# Patient Record
Sex: FEMALE | Race: WHITE | NOT HISPANIC OR LATINO | Employment: UNEMPLOYED | URBAN - METROPOLITAN AREA
[De-identification: names, ages, dates, MRNs, and addresses within clinical notes are randomized per-mention and may not be internally consistent; named-entity substitution may affect disease eponyms.]

---
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
## 2020-09-08 ENCOUNTER — OFFICE VISIT - HEALTHEAST (OUTPATIENT)
Dept: FAMILY MEDICINE | Facility: CLINIC | Age: 20
End: 2020-09-08

## 2020-09-08 DIAGNOSIS — R42 EPISODIC LIGHTHEADEDNESS: ICD-10-CM

## 2020-09-08 LAB
ANION GAP SERPL CALCULATED.3IONS-SCNC: 10 MMOL/L (ref 5–18)
BASOPHILS # BLD AUTO: 0.1 THOU/UL (ref 0–0.2)
BASOPHILS NFR BLD AUTO: 1 % (ref 0–2)
BUN SERPL-MCNC: 10 MG/DL (ref 8–22)
CALCIUM SERPL-MCNC: 9.6 MG/DL (ref 8.5–10.5)
CHLORIDE BLD-SCNC: 109 MMOL/L (ref 98–107)
CO2 SERPL-SCNC: 23 MMOL/L (ref 22–31)
CREAT SERPL-MCNC: 0.78 MG/DL (ref 0.6–1.1)
EOSINOPHIL # BLD AUTO: 0.1 THOU/UL (ref 0–0.4)
EOSINOPHIL NFR BLD AUTO: 2 % (ref 0–6)
ERYTHROCYTE [DISTWIDTH] IN BLOOD BY AUTOMATED COUNT: 10.3 % (ref 11–14.5)
GFR SERPL CREATININE-BSD FRML MDRD: >60 ML/MIN/1.73M2
GLUCOSE BLD-MCNC: 78 MG/DL (ref 70–125)
HCT VFR BLD AUTO: 42.6 % (ref 35–47)
HGB BLD-MCNC: 14.3 G/DL (ref 12–16)
LYMPHOCYTES # BLD AUTO: 3.1 THOU/UL (ref 0.8–4.4)
LYMPHOCYTES NFR BLD AUTO: 41 % (ref 20–40)
MCH RBC QN AUTO: 31.6 PG (ref 27–34)
MCHC RBC AUTO-ENTMCNC: 33.5 G/DL (ref 32–36)
MCV RBC AUTO: 94 FL (ref 80–100)
MONOCYTES # BLD AUTO: 0.4 THOU/UL (ref 0–0.9)
MONOCYTES NFR BLD AUTO: 6 % (ref 2–10)
NEUTROPHILS # BLD AUTO: 3.8 THOU/UL (ref 2–7.7)
NEUTROPHILS NFR BLD AUTO: 51 % (ref 50–70)
PLATELET # BLD AUTO: 334 THOU/UL (ref 140–440)
PMV BLD AUTO: 7.2 FL (ref 7–10)
POTASSIUM BLD-SCNC: 4.6 MMOL/L (ref 3.5–5)
RBC # BLD AUTO: 4.52 MILL/UL (ref 3.8–5.4)
SODIUM SERPL-SCNC: 142 MMOL/L (ref 136–145)
WBC: 7.5 THOU/UL (ref 4–11)

## 2020-09-08 ASSESSMENT — MIFFLIN-ST. JEOR
SCORE: 1639.5
SCORE: 1639.5

## 2020-09-09 ENCOUNTER — COMMUNICATION - HEALTHEAST (OUTPATIENT)
Dept: FAMILY MEDICINE | Facility: CLINIC | Age: 20
End: 2020-09-09

## 2020-11-12 ENCOUNTER — OFFICE VISIT - HEALTHEAST (OUTPATIENT)
Dept: FAMILY MEDICINE | Facility: CLINIC | Age: 20
End: 2020-11-12

## 2020-11-12 DIAGNOSIS — G43.809 OTHER MIGRAINE WITHOUT STATUS MIGRAINOSUS, NOT INTRACTABLE: ICD-10-CM

## 2020-11-12 DIAGNOSIS — M54.2 CERVICALGIA: ICD-10-CM

## 2020-11-12 ASSESSMENT — MIFFLIN-ST. JEOR: SCORE: 1641.31

## 2021-06-04 VITALS
BODY MASS INDEX: 25.38 KG/M2 | HEART RATE: 64 BPM | RESPIRATION RATE: 16 BRPM | WEIGHT: 177.25 LBS | DIASTOLIC BLOOD PRESSURE: 80 MMHG | OXYGEN SATURATION: 100 % | SYSTOLIC BLOOD PRESSURE: 122 MMHG | HEIGHT: 70 IN

## 2021-06-04 VITALS — BODY MASS INDEX: 25.07 KG/M2 | HEIGHT: 70 IN | WEIGHT: 175.1 LBS | TEMPERATURE: 98.1 F

## 2021-06-11 NOTE — PROGRESS NOTES
ASSESSMENT:  1. Episodic lightheadedness  - HM1(CBC and Differential)  - Basic Metabolic Panel  - HM1 (CBC with Diff)  Based on her history some examination it does appear that she has orthostatic hypotension, but I want to have her checked out to make sure there is no specific abnormality that may need to be managed.  As such we will going to get labs noted.  I also did discuss with her the management at this time including increasing the amount of fluid that she has.  And that may be a possibility of connection between having multiple episodes of running stool as well as increased menstrual bleed all adding together to the symptoms.  Will have her get the labs and will discuss with her.    PLAN:  There are no Patient Instructions on file for this visit.    Orders Placed This Encounter   Procedures     Basic Metabolic Panel     HM1 (CBC with Diff)     There are no discontinued medications.    No follow-ups on file.      CHIEF COMPLAINT:  Chief Complaint   Patient presents with     Loss of Consciousness     x 2-3 weeks       HISTORY OF PRESENT ILLNESS:  Shell is a 19 y.o. female presenting to the clinic today.  Patient complains of near syncope.  She has noted episodes whereby she will be sitting down and on attempting to get up will feel that she is going to fall.  Onset was 3 weeks ago, with intermittent course since that time. Patient describes the episode as never actually lost consciousness, had precursor symptoms only, including diaphoresis, greyed out vision, sweatiness, shakiness with slight increase in her heartbeat and tinnitus. Patient also has associated symptoms of  diarrhea, heavy menstrual bleeding and tachycardia/palpitations. The patient denies abdominal pain, excessive thirst and visual aura. Taking culprit meds?: none.  She noted that most of the problems happened when she had actually been slightly physical.  She is noted to sweat a lot.    REVIEW OF SYSTEMS:   Full review of system was done and  is as in the history.  She does have some anxiety that is being managed.  Intermittently smokes.  She is also physically active.  All other systems are negative.    PFSH:  Reviewed, as below.    Social History     Tobacco Use   Smoking Status Current Some Day Smoker   Smokeless Tobacco Current User       No family history on file.    Social History     Socioeconomic History     Marital status: Single     Spouse name: Not on file     Number of children: Not on file     Years of education: Not on file     Highest education level: Not on file   Occupational History     Not on file   Social Needs     Financial resource strain: Not on file     Food insecurity     Worry: Not on file     Inability: Not on file     Transportation needs     Medical: Not on file     Non-medical: Not on file   Tobacco Use     Smoking status: Current Some Day Smoker     Smokeless tobacco: Current User   Substance and Sexual Activity     Alcohol use: Not on file     Drug use: Not on file     Sexual activity: Not on file   Lifestyle     Physical activity     Days per week: Not on file     Minutes per session: Not on file     Stress: Not on file   Relationships     Social connections     Talks on phone: Not on file     Gets together: Not on file     Attends Yazidism service: Not on file     Active member of club or organization: Not on file     Attends meetings of clubs or organizations: Not on file     Relationship status: Not on file     Intimate partner violence     Fear of current or ex partner: Not on file     Emotionally abused: Not on file     Physically abused: Not on file     Forced sexual activity: Not on file   Other Topics Concern     Not on file   Social History Narrative     Not on file       Past Surgical History:   Procedure Laterality Date     ID REMOVE TONSILS/ADENOIDS,<12 Y/O      Description: Tonsillectomy With Adenoidectomy;  Recorded: 01/18/2012;       No Known Allergies    Active Ambulatory Problems     Diagnosis Date Noted  "    Finger Injury      Nasal Passage Blockage (Stuffiness)      Intermittent asthma 02/03/2012     Ulcerative colitis (H) 08/30/2013     Migraine 01/06/2015     Anxiety 01/06/2015     Resolved Ambulatory Problems     Diagnosis Date Noted     No Resolved Ambulatory Problems     No Additional Past Medical History       Current Outpatient Medications   Medication Sig Dispense Refill     albuterol (PROAIR HFA;PROVENTIL HFA;VENTOLIN HFA) 90 mcg/actuation inhaler Inhale 2-4 puffs.       etonogestreL-ethinyl estradioL (NUVARING) 0.12-0.015 mg/24 hr vaginal ring Insert 1 each into the vagina every 28 days. Insert vaginally and leave in place for 3 consecutive weeks, then remove for 1 week.       No current facility-administered medications for this visit.        VITALS:  Vitals:    09/08/20 1434 09/08/20 1435   Temp: 98.1  F (36.7  C)    TempSrc: Oral    Weight: 175 lb 1.6 oz (79.4 kg) 175 lb 1.6 oz (79.4 kg)   Height: 5' 10\" (1.778 m) 5' 10\" (1.778 m)     Wt Readings from Last 3 Encounters:   09/08/20 175 lb 1.6 oz (79.4 kg)     Body mass index is 25.12 kg/m .    PHYSICAL EXAM:  General Appearance: Alert, cooperative, no distress, appears stated age  HEENT: Pupils are equal and reactive, extraocular motions is normal.  Oropharynx is moist.  Neck is supple no notable thyromegaly.  External ears are normal.  Lungs: Clear to auscultation bilaterally, respirations unlabored  Heart: Regular rhythm and normal rate,S1 and S2 normal, no murmur, rub, or gallop she does have orthostatic blood pressures checked which appeared to be positive between laying down and standing up.  Abdomen: Soft  Musculoskeletal: Normal range of motion. No joint swelling or deformity.   Neurologic:  Alert and oriented times 3. Cranial nerves II-XII intact.   Psychiatric: Normal mood and affect.  Please inform the patient that the labs that she had  MEDICATIONS:  Current Outpatient Medications   Medication Sig Dispense Refill     albuterol (PROAIR " HFA;PROVENTIL HFA;VENTOLIN HFA) 90 mcg/actuation inhaler Inhale 2-4 puffs.       etonogestreL-ethinyl estradioL (NUVARING) 0.12-0.015 mg/24 hr vaginal ring Insert 1 each into the vagina every 28 days. Insert vaginally and leave in place for 3 consecutive weeks, then remove for 1 week.       No current facility-administered medications for this visit.

## 2021-06-13 NOTE — PROGRESS NOTES
"  Assessment:     1. Other migraine without status migrainosus, not intractable    2. Cervicalgia            Plan:           We reviewed the likely/potential etiology(ies) for her HA symptoms and we will begin a trial of imitrex as directed. We reviewed potential side effects at length, including fatigue and head pressure, and she will call or return to clinic with any significant difficulties. I will also send a new Rx for flexeril to try at bedtime to see if that helps break the cycle. We reviewed use of OTC analgesics as well as increased fluids and rest, and she will call or return to clinic with any ongoing or worsening symptoms.       Subjective:         Headaches   Shell Coronel is a 20 y.o. female who presents for follow-up of migraine headaches. Headaches are occurring several times per month. Generally, the headaches last a few hours, although the last one she had has been going on for 7 days now. Home treatment has included OTC tylenol, massage and chiropractic with little improvement. She usually gets good relief with chiropractic. She can't take NSAIDs due to her UC. She has some nausea, vomiting, light-headedness and photophobia with headaches and they are worse with head movements. The patient denies muscle weakness, numbness of extremities, speech difficulties and vision problems. She has had some neck pain at times, and has a history of clavicle fracture.     The following portions of the patient's history were reviewed and updated as appropriate: allergies, current medications, past family history, past medical history, past social history, past surgical history and problem list.    Review of Systems  A 12 point comprehensive review of systems was negative except as noted.      Objective:        /80   Pulse 64   Resp 16   Ht 5' 9.5\" (1.765 m)   Wt 177 lb 4 oz (80.4 kg)   SpO2 100%   Breastfeeding No   BMI 25.80 kg/m    Physical Exam:  GEN: Alert and oriented, NAD,  well " nourished  SKIN:  Normal skin turgor, no lesions/rashes   HEENT: moist mucous membranes, no rhinorrhea.    NECK: Normal.  No adenopathy or thyromegaly.  CV: Regular rate and rhythm, no murmurs.   LUNGS: Clear to auscultation bilaterally.    ABDOMEN: Soft, non-tender, non-distended, no masses   EXTREMITY: No edema, cyanosis  NEURO: Grossly normal.

## 2021-06-20 NOTE — LETTER
Letter by Joann Coburn, Nursing Student at      Author: Joann Coburn, Nursing Student Service: -- Author Type: --    Filed:  Encounter Date: 9/9/2020 Status: (Other)        Shell Coronel  [address]  [place] [address]             September 9, 2020         Dear Shell Coronel,    Below are the results from your recent visit:    Resulted Orders   Basic Metabolic Panel   Result Value Ref Range    Sodium 142 136 - 145 mmol/L    Potassium 4.6 3.5 - 5.0 mmol/L    Chloride 109 (H) 98 - 107 mmol/L    CO2 23 22 - 31 mmol/L    Anion Gap, Calculation 10 5 - 18 mmol/L    Glucose 78 70 - 125 mg/dL    Calcium 9.6 8.5 - 10.5 mg/dL    BUN 10 8 - 22 mg/dL    Creatinine 0.78 0.60 - 1.10 mg/dL    GFR MDRD Af Amer >60 >60 mL/min/1.73m2    GFR MDRD Non Af Amer >60 >60 mL/min/1.73m2    Narrative    Fasting Glucose reference range is 70-99 mg/dL per  American Diabetes Association (ADA) guidelines.   HM1 (CBC with Diff)   Result Value Ref Range    WBC 7.5 4.0 - 11.0 thou/uL    RBC 4.52 3.80 - 5.40 mill/uL    Hemoglobin 14.3 12.0 - 16.0 g/dL    Hematocrit 42.6 35.0 - 47.0 %    MCV 94 80 - 100 fL    MCH 31.6 27.0 - 34.0 pg    MCHC 33.5 32.0 - 36.0 g/dL    RDW 10.3 (L) 11.0 - 14.5 %    Platelets 334 140 - 440 thou/uL    MPV 7.2 7.0 - 10.0 fL    Neutrophils % 51 50 - 70 %    Lymphocytes % 41 (H) 20 - 40 %    Monocytes % 6 2 - 10 %    Eosinophils % 2 0 - 6 %    Basophils % 1 0 - 2 %    Neutrophils Absolute 3.8 2.0 - 7.7 thou/uL    Lymphocytes Absolute 3.1 0.8 - 4.4 thou/uL    Monocytes Absolute 0.4 0.0 - 0.9 thou/uL    Eosinophils Absolute 0.1 0.0 - 0.4 thou/uL    Basophils Absolute 0.1 0.0 - 0.2 thou/uL     The lab that you had done  showed that you  do have a mild increase in her chloride level which may mean that you have  elevator for deficit in fluid.  This could also cause the symptoms that you have had  at this time.  The rest of the kidney function were normal.  The hemogram was also normal  and you do not have anemia.  I hope that you  will take the recommendation as we discussed at the clinic and that will have also improved if not, I will encourage her to follow-up for more evaluation ~ Dr. Coburn    Please call with questions or contact us using [employer].

## 2021-06-21 NOTE — LETTER
Letter by Evangelina Andres MD at      Author: Evangelina Andres MD Service: -- Author Type: --    Filed:  Encounter Date: 11/12/2020 Status: (Other)         November 12, 2020     Patient: Shell Coronel   YOB: 2000   Date of Visit: 11/12/2020       To Whom it May Concern:    Shell Coronel was seen in my clinic on 11/12/2020. She should be excused from her classes/assignments from 11/11-11/12/20 due to persistent migraine headaches.     If you have any questions or concerns, please don't hesitate to call.    Sincerely,         Evangelina Andres MD